# Patient Record
Sex: MALE | URBAN - METROPOLITAN AREA
[De-identification: names, ages, dates, MRNs, and addresses within clinical notes are randomized per-mention and may not be internally consistent; named-entity substitution may affect disease eponyms.]

---

## 2022-07-22 ENCOUNTER — AMB VIDEO VISIT (OUTPATIENT)
Dept: OTHER | Facility: HOSPITAL | Age: 22
End: 2022-07-22
Payer: COMMERCIAL

## 2022-07-22 PROCEDURE — 99212 OFFICE O/P EST SF 10 MIN: CPT | Performed by: GENERAL PRACTICE

## 2022-07-22 NOTE — CARE ANYWHERE EVISITS
Visit Summary for Medhat Cantu - Gender: Male - Date of Birth: [id number]  Date: [id number] - Duration: 1 minutes  Patient: Medhat Cantu  Provider: Zander Tsai    Patient Contact Information  Address  [address]; [address] [facility] [address]  [phone]    Visit Topics  Poison ivy  [Added Sadiq Leary - [id number]]    Triage Questions   What is your current physical address in the event of a medical emergency? Answer []  Are you allergic to any medications? Answer []  Are you now or could you be pregnant? Answer []  Do you have any immune system compromise or chronic lung   disease? Answer []  Do you have any vulnerable family members in the home (infant, pregnant, cancer, elderly)? Answer []     Conversation Transcripts  [0A][0A] [Notification] You are connected with Zander Tsai, Family Physician [0A][Notification] Gregorio Chawla is located in [place]  [0A][Notification] Gregorio Chawla has shared health history  [facility]  [0A]    Diagnosis  Allergic contact dermatitis due to plants, except food    Procedures  Value: [id number] Code: CPT-4 UNLISTED E&M SERVICE    Medications Prescribed    Medrol (Km)    Route : oral  Frequency : as instructed  Until directed to stop  Patient Instructions : Follow directions on pack  Refills : 0  Instructions to the Pharmacist : Follow directions on pack  Substitutions allowed      Provider Notes  [0A][0A]  Mode of Communication:  web[0A]History: Face and body rash for four days   Poison ivy contact[0A]Additional pertinent positives: [0A][0A]Pertinent negatives:  [0A]Past medical history:  [0A][0A]Medications: reviewed[0A]Allergies: nkda[0A]Last   menstrual period/pregnant (if applicable):  [0N]SFZN: [0A][0A]Vitals signs (if available):  [0A][0A]Weight:[0A][0A]General: Alert in no acute distress[0A]Assessment: Contact Dermatitis[0A]Diagnosis code: [0A]Plan:  [0A]    Medications: [0A]Medrol [address] care: [0A]     [0A]    Referral or follow up: [0A]     [0A] Medical decision making:  [0A]    Antibiotic choice (if applicable):   [0E][id number] recommendations: [0A]    If you received a prescription at this visit and you have a   question or problem please call [phone] for prescription assistance [0A]    Please print a copy of this note and send it to your regular doctor, or take it to your next visit so it may be included in your medical record [0A]    Please see your   primary care provider on an annual basis or more frequently if directed [0A]The patient voiced understanding and agreement with plan  [0A]    Electronically signed by: Wicho Mcneill(NPI [id number])

## 2024-01-04 ENCOUNTER — OFFICE VISIT (OUTPATIENT)
Dept: URGENT CARE | Age: 24
End: 2024-01-04
Payer: COMMERCIAL

## 2024-01-04 VITALS
HEART RATE: 88 BPM | RESPIRATION RATE: 18 BRPM | TEMPERATURE: 97.7 F | OXYGEN SATURATION: 96 % | DIASTOLIC BLOOD PRESSURE: 63 MMHG | SYSTOLIC BLOOD PRESSURE: 137 MMHG | WEIGHT: 204 LBS

## 2024-01-04 DIAGNOSIS — R52 BODY ACHES: ICD-10-CM

## 2024-01-04 DIAGNOSIS — J06.9 ACUTE URI: Primary | ICD-10-CM

## 2024-01-04 PROCEDURE — 99213 OFFICE O/P EST LOW 20 MIN: CPT

## 2024-01-04 PROCEDURE — 87636 SARSCOV2 & INF A&B AMP PRB: CPT

## 2024-01-04 RX ORDER — DEXTROAMPHETAMINE SACCHARATE, AMPHETAMINE ASPARTATE MONOHYDRATE, DEXTROAMPHETAMINE SULFATE AND AMPHETAMINE SULFATE 3.75; 3.75; 3.75; 3.75 MG/1; MG/1; MG/1; MG/1
CAPSULE, EXTENDED RELEASE ORAL
COMMUNITY

## 2024-01-04 NOTE — PATIENT INSTRUCTIONS
Vitamin D3 2000 IU daily  Vitamin C 1000mg twice per day  Multivitamin daily  Fluids and rest  Over the counter cold medication as needed (EX: Mucinex, tylenol/motrin)  Follow up with PCP in 3-5 days.  Proceed to ER if symptoms worsen.

## 2024-01-04 NOTE — PROGRESS NOTES
[facility]        NAME: Garfield Cantu is a 23 y.o. male  : 2000    MRN: [id number]  DATE: 2024  TIME: 12:56 PM    Assessment and Plan   Acute URI [J06.9]  1. Acute URI        2. Body aches  Covid/Flu- Office Collect Normal            Patient Instructions     Vitamin D3 2000 IU daily  Vitamin C 1000mg twice per day  Multivitamin daily  Fluids and rest  Over the counter cold medication as needed (EX: Mucinex, tylenol/motrin)  Follow up with PCP in 3-5 days.  Proceed to  ER if symptoms worsen.    Chief Complaint     Chief Complaint   Patient presents with    Fever     Tmax 101 last night. Covid test at home negative. Sore throat, b/l ear ringing,  congestion, cough, body aches. Sx since yesterday morning, had to leave work early yesterday. Taking dayquil, nyquil.          History of Present Illness       Patient is a 22 yo male with no significant PMH presenting in the clinic today for cold sx x 1 day. Admits fever (Tmax 101), chills, body aches, congestion, cough, sore throat, and b/l ear pain/fullness. Denies fatigue, rhinorrhea, headache, chest pain, SOB, abdominal pain, n/v/d. Admits the use of DayQuil, tylenol, robitussin and NyQuil for sx management. Positive recent sick contacts at work.         Review of Systems   Review of Systems   Constitutional:  Positive for chills and fever. Negative for fatigue.   HENT:  Positive for congestion, ear pain and sore throat. Negative for postnasal drip, rhinorrhea, sinus pressure and sinus pain.    Respiratory:  Positive for cough. Negative for shortness of breath.    Cardiovascular:  Negative for chest pain.   Gastrointestinal:  Negative for abdominal pain, diarrhea, nausea and vomiting.   Musculoskeletal:  Positive for myalgias.   Skin:  Negative for rash.   Neurological:  Negative for headaches.         Current Medications       Current Outpatient Medications:     amphetamine-dextroamphetamine (ADDERALL XR) 15 MG 24 hr capsule, take 1  capsule by mouth every morning MAX DAILY AMOUNT IS 1 CAPSULE, Disp: , Rfl:     Current Allergies     Allergies as of 01/04/2024    (No Known Allergies)            The following portions of the patient's history were reviewed and updated as appropriate: allergies, current medications, past family history, past medical history, past social history, past surgical history and problem list.     No past medical history on file.    No past surgical history on file.    No family history on file.      Medications have been verified.        Objective   /63   Pulse 88   Temp 97.7 °F (36.5 °C)   Resp 18   Wt 92.5 kg (204 lb)   SpO2 96%        Physical Exam     Physical Exam  Vitals reviewed.   Constitutional:       General: He is not in acute distress.     Appearance: Normal appearance. He is normal weight. He is not ill-appearing.   HENT:      Head: Normocephalic.      Right Ear: Hearing, ear canal and external ear normal. A middle ear effusion is present. There is no impacted cerumen. Tympanic membrane is not erythematous or bulging.      Left Ear: Hearing, ear canal and external ear normal. A middle ear effusion is present. There is no impacted cerumen. Tympanic membrane is not erythematous or bulging.      Nose: Congestion present. No rhinorrhea.      Right Sinus: No maxillary sinus tenderness or frontal sinus tenderness.      Left Sinus: No maxillary sinus tenderness or frontal sinus tenderness.      Mouth/Throat:      Lips: Pink.      Mouth: Mucous membranes are moist.      Pharynx: Oropharynx is clear. Uvula midline. Posterior oropharyngeal erythema present. No pharyngeal swelling, oropharyngeal exudate or uvula swelling.      Tonsils: No tonsillar exudate or tonsillar abscesses. 1+ on the right. 1+ on the left.   Eyes:      General:         Right eye: No discharge.         Left eye: No discharge.      Conjunctiva/sclera: Conjunctivae normal.   Cardiovascular:      Rate and Rhythm: Normal rate and regular  rhythm.      Pulses: Normal pulses.      Heart sounds: Normal heart sounds. No murmur heard.     No friction rub. No gallop.   Pulmonary:      Effort: Pulmonary effort is normal. No respiratory distress.      Breath sounds: Normal breath sounds. No stridor. No wheezing, rhonchi or rales.   Musculoskeletal:      Cervical back: Normal range of motion and neck supple. No tenderness.   Lymphadenopathy:      Cervical: No cervical adenopathy.   Skin:     General: Skin is warm.      Findings: No rash.   Neurological:      Mental Status: He is alert.   Psychiatric:         Mood and Affect: Mood normal.         Behavior: Behavior normal.

## 2024-01-04 NOTE — LETTER
[facility] [place]  [address]  [place] [address]  Dept: [phone] January 4, 2024    Patient: Garfield Cantu  YOB: 2000    Garfield Cantu was seen and evaluated at our [facility]. Please note if Covid and Flu tests are negative, they may return to work on 1/5/2024 when fever free for 24 hours without the use of a fever reducing agent. If Covid or Flu test is positive, they may return to work on 1/9/2024, as this is 5 days from the onset of symptoms. Upon return, they must then adhere to strict masking for an additional 5 days.    Sincerely,    Olivia Kirkland PA-C

## 2024-01-05 LAB
FLUAV RNA RESP QL NAA+PROBE: NEGATIVE
FLUBV RNA RESP QL NAA+PROBE: NEGATIVE
SARS-COV-2 RNA RESP QL NAA+PROBE: NEGATIVE